# Patient Record
Sex: MALE
[De-identification: names, ages, dates, MRNs, and addresses within clinical notes are randomized per-mention and may not be internally consistent; named-entity substitution may affect disease eponyms.]

---

## 2023-09-21 ENCOUNTER — NURSE TRIAGE (OUTPATIENT)
Dept: OTHER | Facility: CLINIC | Age: 28
End: 2023-09-21

## 2023-09-21 NOTE — TELEPHONE ENCOUNTER
Location of patient: [place]    Subjective: Caller states \"I am calling, I work at [employer]. [employer]. 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 I have not been feeling well. Cold sweats, coughing, sneezing, SOB (this am). Headaches. I did take some COVID tests Monday that were negative. SOB on exertion (SOB when climbing stairs) \"    Current Symptoms: Cold sweats, coughing, sneezing, SOB , headaches. Onset: 6 days ago; sudden    Associated Symptoms: reduced appetite    Pain Severity: 8/10; throbbing; constant    Temperature: caller denies by unknown method    What has been tried: Advil - helps for about 10- 15 min    LMP: NA Pregnant: NA    Recommended disposition: See HCP within 4 Hours (or PCP triage) - [facility] clinic. Care advice provided, patient verbalizes understanding; denies any other questions or concerns; instructed to call back for any new or worsening symptoms. Patient/caller agrees to follow-up with PCP     This triage is a result of a call to [address] [place] [address]. Please do not respond to the triage nurse through this encounter. Any subsequent communication should be directly with the patient.       Reason for Disposition   MILD difficulty breathing (e.g., minimal/no SOB at rest, SOB with walking, pulse <100)    Protocols used: Coronavirus (COVID-19) Diagnosed or Suspected-ADULT-